# Patient Record
Sex: FEMALE | Race: WHITE | Employment: UNEMPLOYED | URBAN - METROPOLITAN AREA
[De-identification: names, ages, dates, MRNs, and addresses within clinical notes are randomized per-mention and may not be internally consistent; named-entity substitution may affect disease eponyms.]

---
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
## 2024-05-06 RX ORDER — ESCITALOPRAM OXALATE 20 MG/1
30 TABLET ORAL DAILY
Qty: 135 TABLET | Refills: 0 | Status: SHIPPED | OUTPATIENT
Start: 2024-05-06

## 2024-05-06 NOTE — TELEPHONE ENCOUNTER
Last OV: 12/27/2023  Last RX: 11/15/2023   Next scheduled apt: Visit date not found      Surescripts requesting refill     Stage 14: Number Of Blocks?: 0

## 2024-05-13 ENCOUNTER — OFFICE VISIT (OUTPATIENT)
Dept: FAMILY MEDICINE CLINIC | Age: 19
End: 2024-05-13
Payer: COMMERCIAL

## 2024-05-13 VITALS
BODY MASS INDEX: 23.82 KG/M2 | DIASTOLIC BLOOD PRESSURE: 80 MMHG | HEART RATE: 82 BPM | SYSTOLIC BLOOD PRESSURE: 118 MMHG | HEIGHT: 65 IN | WEIGHT: 143 LBS | OXYGEN SATURATION: 98 %

## 2024-05-13 DIAGNOSIS — Z98.890 S/P HIP ARTHROSCOPY: ICD-10-CM

## 2024-05-13 DIAGNOSIS — Z48.02 VISIT FOR SUTURE REMOVAL: Primary | ICD-10-CM

## 2024-05-13 DIAGNOSIS — F41.9 ANXIETY: ICD-10-CM

## 2024-05-13 PROCEDURE — G8420 CALC BMI NORM PARAMETERS: HCPCS | Performed by: NURSE PRACTITIONER

## 2024-05-13 PROCEDURE — 1036F TOBACCO NON-USER: CPT | Performed by: NURSE PRACTITIONER

## 2024-05-13 PROCEDURE — 99213 OFFICE O/P EST LOW 20 MIN: CPT | Performed by: NURSE PRACTITIONER

## 2024-05-13 PROCEDURE — G8427 DOCREV CUR MEDS BY ELIG CLIN: HCPCS | Performed by: NURSE PRACTITIONER

## 2024-05-13 RX ORDER — HYDROCODONE BITARTRATE AND ACETAMINOPHEN 7.5; 325 MG/1; MG/1
TABLET ORAL
COMMUNITY
Start: 2024-05-01

## 2024-05-13 RX ORDER — CYCLOBENZAPRINE HCL 5 MG
TABLET ORAL
COMMUNITY
Start: 2024-05-01

## 2024-05-13 ASSESSMENT — PATIENT HEALTH QUESTIONNAIRE - PHQ9
SUM OF ALL RESPONSES TO PHQ QUESTIONS 1-9: 0
2. FEELING DOWN, DEPRESSED OR HOPELESS: NOT AT ALL
SUM OF ALL RESPONSES TO PHQ QUESTIONS 1-9: 0
SUM OF ALL RESPONSES TO PHQ9 QUESTIONS 1 & 2: 0
SUM OF ALL RESPONSES TO PHQ QUESTIONS 1-9: 0
1. LITTLE INTEREST OR PLEASURE IN DOING THINGS: NOT AT ALL
SUM OF ALL RESPONSES TO PHQ QUESTIONS 1-9: 0

## 2024-05-13 NOTE — PATIENT INSTRUCTIONS
THANK YOU FOR TRUSTING US WITH YOUR HEALTHCARE !!    The associates caring for you today were:    Family Practice Provider: Mercedes MONTANA-BIANCA    Clinical Team Nurse: Clara Jacobsen LPN    Clinical Team Medical Assistant: Kathie Dang MA    Our goal is to provide you with EXCEPTIONAL patient care, and we strive to do this for EVERY patient, EVERY visit. Since we care about you and your experience, you may receive a patient satisfaction survey from [employer] by postal mail/email/text. We truly welcome your feedback and ask that you complete the survey to let us know how we are doing.    Important Numbers:  [facility] phone [phone]   [facility] Nurse/[place] Line: [phone]  Fax  [phone]   Central [phone]  Billing questions: [phone]  Medical Records Request: [phone]    Manage your healthcare  with [facility] My[facility]. To activate your account, visit [email or web address]   DIGITAL scheduling available now through my chart.  Schedule your next appointment at your convenience through your my chart.       [facility] Office Hours:  Monday: [place] office location 8-5 ([phone]) Offering additional late hours the first Monday of the month until 7 pm.   Tuesday: 8: :  812 Noon, closed  of the month   : 7:30-4:30   SURVEY:    You may be receiving a survey from Olga Nelson regarding your visit today.    Please complete the survey to enable us to provide the highest quality of care to you and your family.    If you cannot score us a very good on any question, please call the office to discuss how we could have made your experience a very good one.    Thank you.

## 2024-05-14 ASSESSMENT — ENCOUNTER SYMPTOMS
EYES NEGATIVE: 1
SHORTNESS OF BREATH: 0
COUGH: 0
CHEST TIGHTNESS: 0
WHEEZING: 0

## 2024-05-14 NOTE — PROGRESS NOTES
HPI Notes    Name: Jeff Hill  : 2005         Chief Complaint:     Chief Complaint   Patient presents with    Suture / Staple Removal     Pt had surgery in [place] on 24 with Dr John, she has approx 8 sutures around her right anterior hip area that need removed today. Pt had right hip capsule repair/revision from her previous hip surgery 6 months prior       History of Present Illness:        Suture / Staple Removal        Patient here today for suture removal status post capsular repair of her right hip area with previous labrum repair as separate surgery time wounds are well-approximated no signs of infection no redness or drainage patient does have a lot of adhesive remaining on the leg in the abdomen area from her original dressing changes she has limitations with her physical therapy also is wearing a brace today that she is able to remove for this stitch removal    Patient has 3 mattress stitches in 3 individual which all 6 are removed today without incident patient tolerated well wounds are cleaned with Betadine right anterior lowest wound is to quarter inch Steri-Strips are applied to that area only    Wound care is reviewed patient with diligent attention to prevent injury and stay within limits of her recovery    Continue physical therapy at [facility]    Patient with anxiety and is on Lexapro 30 mg pharmacy would not dispense the full 30 mg dosing has been on that for her entire school year and has done well will consider adding SNRI or see if patient does well with the 20 mg with using Vistaril as a supplement she says she can notice a difference likely when she gets to the 20 mg dose she will stay consistent and do okay    Past Medical History:     Past Medical History:   Diagnosis Date    Anxiety     Migraines       Reviewed all health maintenance requirements and ordered appropriate tests  Health Maintenance Due   Topic Date Due    HIV screen  Never done    Chlamydia/GC screen  Never

## 2024-05-20 RX ORDER — ESCITALOPRAM OXALATE 10 MG/1
10 TABLET ORAL DAILY
Qty: 90 TABLET | Refills: 1 | OUTPATIENT
Start: 2024-05-20

## 2024-05-28 RX ORDER — ESCITALOPRAM OXALATE 20 MG/1
TABLET ORAL
Qty: 90 TABLET | OUTPATIENT
Start: 2024-05-28

## 2024-09-16 RX ORDER — ESCITALOPRAM OXALATE 20 MG/1
TABLET ORAL
Qty: 135 TABLET | Refills: 0 | Status: SHIPPED | OUTPATIENT
Start: 2024-09-16

## 2024-12-09 ENCOUNTER — OFFICE VISIT (OUTPATIENT)
Dept: FAMILY MEDICINE CLINIC | Age: 19
End: 2024-12-09
Payer: COMMERCIAL

## 2024-12-09 VITALS
OXYGEN SATURATION: 96 % | HEART RATE: 119 BPM | WEIGHT: 141 LBS | DIASTOLIC BLOOD PRESSURE: 70 MMHG | HEIGHT: 65 IN | SYSTOLIC BLOOD PRESSURE: 110 MMHG | BODY MASS INDEX: 23.49 KG/M2 | TEMPERATURE: 98.5 F

## 2024-12-09 DIAGNOSIS — J18.9 ATYPICAL PNEUMONIA: Primary | ICD-10-CM

## 2024-12-09 PROCEDURE — G8420 CALC BMI NORM PARAMETERS: HCPCS | Performed by: NURSE PRACTITIONER

## 2024-12-09 PROCEDURE — G8427 DOCREV CUR MEDS BY ELIG CLIN: HCPCS | Performed by: NURSE PRACTITIONER

## 2024-12-09 PROCEDURE — G8484 FLU IMMUNIZE NO ADMIN: HCPCS | Performed by: NURSE PRACTITIONER

## 2024-12-09 PROCEDURE — 1036F TOBACCO NON-USER: CPT | Performed by: NURSE PRACTITIONER

## 2024-12-09 PROCEDURE — 99213 OFFICE O/P EST LOW 20 MIN: CPT | Performed by: NURSE PRACTITIONER

## 2024-12-09 RX ORDER — AZITHROMYCIN 250 MG/1
TABLET, FILM COATED ORAL
Qty: 1 PACKET | Refills: 0 | Status: SHIPPED | OUTPATIENT
Start: 2024-12-09 | End: 2024-12-19

## 2024-12-09 NOTE — PROGRESS NOTES
Symptoms started Friday, chest fullness, sore throat, fever 101, chills, and sweats, phlegm is clear   Went to  on Sat, Augmentin and capmist DM - still taking the antibiotic   Taking Tylenol and Ibuprofen   No cough, but hurts to cough; no chest pain, different headaches, runny nose, congestion, had 1 episode of eye redness, intermittent bilateral ear pain    Her pulse ox got as high as 145     Been staying at home, brother was sick last week, sister was also around him and is sick now   No recent travels    No smoking or vaping    Drinking fluids but slight loss of appetite    Covid test was negative

## 2024-12-09 NOTE — PROGRESS NOTES
HPI Notes    Name: Jeff Hill  : 2005         Chief Complaint:     Chief Complaint   Patient presents with   • Fever     Low grade. Ongoing x 3-4 days. Went to urgent care on Saturday and was given an antibiotic and cough medicine with no improvement   • Sweats     Ongoing x 3-4 days   • Sore Throat     Ongoing x 3-4 days   • Cough     Ongoing x 3-4 days   • Abdominal Pain       History of Present Illness:        HPI  Pt with c/o illness onset Friday of last week .   Brother with atypical bronchitis/like pneumonia  Cold sweats and felt chilled.     Fever 101.   Hard to eat no appetite     Chest tightness.         Past Medical History:     Past Medical History:   Diagnosis Date   • Anxiety    • Migraines       Reviewed all health maintenance requirements and ordered appropriate tests  Health Maintenance Due   Topic Date Due   • HIV screen  Never done   • Chlamydia/GC screen  Never done   • Hepatitis C screen  Never done   • Flu vaccine (1) 2024   • COVID-19 Vaccine ( season) 2024       Past Surgical History:     History reviewed. No pertinent surgical history.     Medications:       Prior to Admission medications    Medication Sig Start Date End Date Taking? Authorizing Provider   amoxicillin-clavulanate (AUGMENTIN) 875-125 MG per tablet Take 1 tablet by mouth 2 times daily 24 Yes Provider, Historical, MD   Pseudoephedrine-DM-GG 60- MG TABS Take 1 tablet by mouth every 6 hours as needed 24 Yes Provider, Historical, MD   azithromycin (ZITHROMAX Z-VIVIAN) 250 MG tablet Two tablets by mouth the first day  then one tablet daily for 4 days for atypical symptoms 24 Yes Mercedes Gil APRN - CNP   escitalopram (LEXAPRO) 20 MG tablet TAKE 1AND 1/2 TABLET BY MOUTH EVERY DAY 24  Yes Mercedes Gil APRN - CNP   HYDROcodone-acetaminophen (NORCO) 7.5-325 MG per tablet  24  Yes Provider, Historical, MD   cyclobenzaprine (FLEXERIL) 5 MG

## 2024-12-09 NOTE — PATIENT INSTRUCTIONS
THANK YOU FOR TRUSTING US WITH YOUR HEALTHCARE !!    The associates caring for you today were:    Family Practice Provider: Mercedes MONTANA-BIANCA    Clinical Team Nurse: Clara Jacobsen LPN    Clinical Team Medical Assistant: Kathie Dang MA    Our goal is to provide you with EXCEPTIONAL patient care, and we strive to do this for EVERY patient, EVERY visit. Since we care about you and your experience, you may receive a patient satisfaction survey from Olga Nelson by postal mail/email/text. We truly welcome your feedback and ask that you complete the survey to let us know how we are doing.    Important Numbers:  [facility] phone [phone]   [place] Office Nurse/[place] Line: [phone]  Fax  [phone]   Central [phone]  Billing questions: [phone]  Medical Records Request: [phone]    Manage your healthcare  with [facility] MyChart. To activate your account, visit [email or web address]   DIGITAL scheduling available now through my chart.  Schedule your next appointment at your convenience through your my chart.       [place] Office Hours:  Monday: [place] office location 8-5 ([phone]) Offering additional late hours the first Monday of the month until 7 pm.   Tuesday: 8-: :  8-12 Noon, closed  of the month   : 7:30-4:30   SURVEY:    You may be receiving a survey from Olga Nelson regarding your visit today.    Please complete the survey to enable us to provide the highest quality of care to you and your family.    If you cannot score us a very good on any question, please call the office to discuss how we could have made your experience a very good one.    Thank you.     GIVE UPDATE WEDNESDAY AM.

## 2024-12-10 ENCOUNTER — PATIENT MESSAGE (OUTPATIENT)
Dept: FAMILY MEDICINE CLINIC | Age: 19
End: 2024-12-10

## 2024-12-10 ASSESSMENT — ENCOUNTER SYMPTOMS
RHINORRHEA: 1
ABDOMINAL DISTENTION: 0
COUGH: 1
SORE THROAT: 1

## 2024-12-16 RX ORDER — ESCITALOPRAM OXALATE 20 MG/1
30 TABLET ORAL DAILY
Qty: 135 TABLET | Refills: 1 | Status: SHIPPED | OUTPATIENT
Start: 2024-12-16

## 2024-12-16 NOTE — TELEPHONE ENCOUNTER
Last OV: 12/9/2024  pneumonia   Last RX:    Next scheduled apt: Visit date not found           Surescript requesting a refill   Medication pending for approval

## 2025-05-13 ENCOUNTER — OFFICE VISIT (OUTPATIENT)
Dept: FAMILY MEDICINE CLINIC | Age: 20
End: 2025-05-13
Payer: COMMERCIAL

## 2025-05-13 VITALS
OXYGEN SATURATION: 96 % | BODY MASS INDEX: 23.32 KG/M2 | HEIGHT: 65 IN | HEART RATE: 89 BPM | WEIGHT: 140 LBS | DIASTOLIC BLOOD PRESSURE: 70 MMHG | TEMPERATURE: 98.1 F | SYSTOLIC BLOOD PRESSURE: 106 MMHG

## 2025-05-13 DIAGNOSIS — J01.10 ACUTE NON-RECURRENT FRONTAL SINUSITIS: Primary | ICD-10-CM

## 2025-05-13 PROCEDURE — 1036F TOBACCO NON-USER: CPT | Performed by: NURSE PRACTITIONER

## 2025-05-13 PROCEDURE — G8427 DOCREV CUR MEDS BY ELIG CLIN: HCPCS | Performed by: NURSE PRACTITIONER

## 2025-05-13 PROCEDURE — G8420 CALC BMI NORM PARAMETERS: HCPCS | Performed by: NURSE PRACTITIONER

## 2025-05-13 PROCEDURE — 99213 OFFICE O/P EST LOW 20 MIN: CPT | Performed by: NURSE PRACTITIONER

## 2025-05-13 SDOH — ECONOMIC STABILITY: FOOD INSECURITY: WITHIN THE PAST 12 MONTHS, THE FOOD YOU BOUGHT JUST DIDN'T LAST AND YOU DIDN'T HAVE MONEY TO GET MORE.: NEVER TRUE

## 2025-05-13 SDOH — ECONOMIC STABILITY: FOOD INSECURITY: WITHIN THE PAST 12 MONTHS, YOU WORRIED THAT YOUR FOOD WOULD RUN OUT BEFORE YOU GOT MONEY TO BUY MORE.: NEVER TRUE

## 2025-05-13 ASSESSMENT — PATIENT HEALTH QUESTIONNAIRE - PHQ9
SUM OF ALL RESPONSES TO PHQ QUESTIONS 1-9: 0
SUM OF ALL RESPONSES TO PHQ QUESTIONS 1-9: 0
2. FEELING DOWN, DEPRESSED OR HOPELESS: NOT AT ALL
SUM OF ALL RESPONSES TO PHQ QUESTIONS 1-9: 0
SUM OF ALL RESPONSES TO PHQ QUESTIONS 1-9: 0
1. LITTLE INTEREST OR PLEASURE IN DOING THINGS: NOT AT ALL

## 2025-05-13 ASSESSMENT — ENCOUNTER SYMPTOMS
NAUSEA: 0
SORE THROAT: 1
SHORTNESS OF BREATH: 0
COUGH: 1
RHINORRHEA: 1
VOMITING: 0
DIARRHEA: 0

## 2025-05-13 NOTE — PATIENT INSTRUCTIONS
SURVEY:    You may be receiving a survey from [facility] regarding your visit today.    You may get this in the mail, through your MyChart or in your email.     Please complete the survey to enable us to provide the highest quality of care to you and your family.      Thank you.    Clinical Care Team:         NAN Hickman-MAGDALENO Regan                         Triage:         MAGDALENO Dominguez             Clerical Team:        Lissa Leung       [place] [phone]           Billing questions: [phone]           Medical Records Request: [phone]

## 2025-05-13 NOTE — PROGRESS NOTES
HPI Notes    Name: Jeff Hill  : 2005         Chief Complaint:     Chief Complaint   Patient presents with    Congestion     X 1 week. States that she is dizzy, blurry vision, and having post nasal drip.        History of Present Illness:        Cold Symptoms   This is a new problem. The current episode started 1 to 4 weeks ago (8 days ago). The problem has been waxing and waning. There has been no fever. Associated symptoms include congestion, coughing, headaches, a plugged ear sensation, rhinorrhea and a sore throat. Pertinent negatives include no chest pain, diarrhea, ear pain, nausea or vomiting. She has tried antihistamine (mucinex) for the symptoms.       Past Medical History:     Past Medical History:   Diagnosis Date    Anxiety     Migraines       Reviewed all health maintenance requirements and ordered appropriate tests  Health Maintenance Due   Topic Date Due    HIV screen  Never done    Meningococcal B vaccine (1 of 2 - Standard) Never done    Chlamydia/GC screen  Never done    Hepatitis C screen  Never done    COVID-19 Vaccine (4 - 2024-25 season) 2024    Pap smear  Never done    Depression Screen  2025       Past Surgical History:     No past surgical history on file.     Medications:       Prior to Admission medications    Medication Sig Start Date End Date Taking? Authorizing Provider   amoxicillin-clavulanate (AUGMENTIN) 875-125 MG per tablet Take 1 tablet by mouth 2 times daily for 7 days 25 Yes Sherif Euceda DNP   escitalopram (LEXAPRO) 20 MG tablet TAKE 1 AND 1/2 TABLETS BY MOUTH EVERY DAY 24  Yes Mercedes Gil, APRN - CNP   HYDROcodone-acetaminophen (NORCO) 7.5-325 MG per tablet  24  Yes Provider, Historical, MD   cyclobenzaprine (FLEXERIL) 5 MG tablet  24  Yes Provider, Historical, MD   QULIPTA 60 MG TABS  10/17/23  Yes Provider, Historical, MD VEGA LOESTRIN FE 1 MG-10 MCG / 10 MCG tablet Take 1 tablet by mouth daily 23  Yes

## 2025-07-01 ENCOUNTER — HOSPITAL ENCOUNTER (OUTPATIENT)
Age: 20
Setting detail: SPECIMEN
Discharge: HOME | End: 2025-07-01
Payer: COMMERCIAL

## 2025-07-01 ENCOUNTER — OFFICE VISIT (OUTPATIENT)
Dept: PRIMARY CARE CLINIC | Age: 20
End: 2025-07-01
Payer: COMMERCIAL

## 2025-07-01 VITALS
BODY MASS INDEX: 22.82 KG/M2 | SYSTOLIC BLOOD PRESSURE: 110 MMHG | OXYGEN SATURATION: 98 % | HEIGHT: 66 IN | DIASTOLIC BLOOD PRESSURE: 78 MMHG | WEIGHT: 142 LBS | HEART RATE: 80 BPM

## 2025-07-01 DIAGNOSIS — R11.0 NAUSEA: ICD-10-CM

## 2025-07-01 DIAGNOSIS — A08.4 VIRAL GASTROENTERITIS: Primary | ICD-10-CM

## 2025-07-01 DIAGNOSIS — M62.830 BACK MUSCLE SPASM: ICD-10-CM

## 2025-07-01 DIAGNOSIS — R82.998 URINE LEUKOCYTES: ICD-10-CM

## 2025-07-01 DIAGNOSIS — R52 BODY ACHES: ICD-10-CM

## 2025-07-01 DIAGNOSIS — A08.4 VIRAL GASTROENTERITIS: ICD-10-CM

## 2025-07-01 LAB
BILIRUBIN, POC: NORMAL
CLARITY, POC: CLEAR
COLOR, POC: YELLOW
GLUCOSE URINE, POC: NORMAL MG/DL
KETONES, POC: NORMAL MG/DL
LEUKOCYTE EST, POC: NORMAL
NITRITE, POC: NORMAL
PH, POC: 7
PROTEIN, POC: NORMAL MG/DL
SPECIFIC GRAVITY, POC: 1.02
UROBILINOGEN, POC: 0.2 MG/DL

## 2025-07-01 PROCEDURE — 81003 URINALYSIS AUTO W/O SCOPE: CPT | Performed by: NURSE PRACTITIONER

## 2025-07-01 PROCEDURE — G8427 DOCREV CUR MEDS BY ELIG CLIN: HCPCS | Performed by: NURSE PRACTITIONER

## 2025-07-01 PROCEDURE — 87077 CULTURE AEROBIC IDENTIFY: CPT

## 2025-07-01 PROCEDURE — 1036F TOBACCO NON-USER: CPT | Performed by: NURSE PRACTITIONER

## 2025-07-01 PROCEDURE — 99213 OFFICE O/P EST LOW 20 MIN: CPT | Performed by: NURSE PRACTITIONER

## 2025-07-01 PROCEDURE — 87086 URINE CULTURE/COLONY COUNT: CPT

## 2025-07-01 PROCEDURE — G8420 CALC BMI NORM PARAMETERS: HCPCS | Performed by: NURSE PRACTITIONER

## 2025-07-01 RX ORDER — NORETHINDRONE ACETATE AND ETHINYL ESTRADIOL AND FERROUS FUMARATE 1.5-30(21)
1 KIT ORAL DAILY
COMMUNITY
Start: 2025-04-17

## 2025-07-01 RX ORDER — ONDANSETRON 4 MG/1
4 TABLET, ORALLY DISINTEGRATING ORAL EVERY 12 HOURS PRN
Qty: 10 TABLET | Refills: 0 | Status: SHIPPED | OUTPATIENT
Start: 2025-07-01

## 2025-07-01 RX ORDER — CYCLOBENZAPRINE HCL 5 MG
5 TABLET ORAL NIGHTLY PRN
Qty: 15 TABLET | Refills: 0 | Status: SHIPPED | OUTPATIENT
Start: 2025-07-01 | End: 2025-09-29

## 2025-07-01 ASSESSMENT — ENCOUNTER SYMPTOMS
ABDOMINAL PAIN: 1
CHEST TIGHTNESS: 0
ABDOMINAL DISTENTION: 0
NAUSEA: 1
VOMITING: 0
WHEEZING: 0
COUGH: 0
SORE THROAT: 0
BLOOD IN STOOL: 0
ANAL BLEEDING: 0
SHORTNESS OF BREATH: 0
CONSTIPATION: 0
DIARRHEA: 1
RECTAL PAIN: 0
EYES NEGATIVE: 1

## 2025-07-01 NOTE — PATIENT INSTRUCTIONS
THANK YOU FOR TRUSTING US WITH YOUR HEALTHCARE !!    The associates caring for you today were:    Family Practice Provider: Mercedes MONTANA-BIANCA    Clinical Team Nurse: Clara Jacobsen LPN    Clinical Team Medical Assistant: Kathie Dang MA    Our goal is to provide you with EXCEPTIONAL patient care, and we strive to do this for EVERY patient, EVERY visit. Since we care about you and your experience, you may receive a patient satisfaction survey from [facility] by postal mail/email/text. We truly welcome your feedback and ask that you complete the survey to let us know how we are doing.    Important Numbers:  [facility] phone [phone]   [facility] Nurse/MA Line: [phone]  Fax  [phone]   Central [phone]  Billing questions: [phone]  Medical Records Request: [phone]    Manage your healthcare  with [facility] My[facility]. To activate your account, visit [email or web address]   DIGITAL scheduling available now through my chart.  Schedule your next appointment at your convenience through your my chart.       [facility] Hours:  Monday: [place] office location 8-5 ([phone]) Offering additional late hours the first Monday of the month until 7 pm.   Tuesday: : :  812 Noon, closed  of the month   Fridays: 7:30-4:30

## 2025-07-01 NOTE — PROGRESS NOTES
[facility] [place]  [address] [address]  Dept: [phone]  Dept Fax: [phone]    Last encounter 12/27/2023    Abdominal Pain (Pt states for the last 4-5 days every time after she eats she feels nauseous, but doesn't vomit. ) and Diarrhea (Pt has had loose stools for the last 4-5 days, but yesterday stool was liquid. Last BM was last night before bed (liquid/loose). Denies any blood)       HPI:   Jeff Hill is a 20 y.o. female who presentstoday for her medical conditions/complaints as noted below.  Jeff Hill is c/o of Abdominal Pain (Pt states for the last 4-5 days every time after she eats she feels nauseous, but doesn't vomit. ) and Diarrhea (Pt has had loose stools for the last 4-5 days, but yesterday stool was liquid. Last BM was last night before bed (liquid/loose). Denies any blood)      HPI  Established patient    Pt with c/o abdominal pain especially after eating.     Went out to eat Friday at [employer]   Yesterday after lunch diarrhea. Some hot/cold 99 temp   No vomiting. , nausea present.     (Burger , sweet potato fried, burger well done) lettuce tomato on )     Diarrhea one of diarrhea immediately after eating.   One meal a day since she has been sick.     Nausea constantly there.     Central abd pain.       No hx UTI , not sexually active.   Tried omeprazole once no help. One dose     On a few vitiligo  hands , elbows, knees, armpit.     Patient denies any pain in the right upper quadrant or into the back she does feel like her right hip which has had multiple surgeries (3 total (that she is still receiving physical therapy for does hurt when she takes a deep breath she has had some neck pain recently does have some kyphosis with her back and is working with physical therapy to help with the spasm and ache that she feels there    She was on some Flexeril before them okay refilling giving her small

## 2025-07-03 ENCOUNTER — RESULTS FOLLOW-UP (OUTPATIENT)
Dept: PRIMARY CARE CLINIC | Age: 20
End: 2025-07-03

## 2025-07-03 LAB
MICROORGANISM SPEC CULT: ABNORMAL
SERVICE CMNT-IMP: ABNORMAL
SPECIMEN DESCRIPTION: ABNORMAL

## 2025-07-03 RX ORDER — NITROFURANTOIN 25; 75 MG/1; MG/1
100 CAPSULE ORAL 2 TIMES DAILY
Qty: 20 CAPSULE | Refills: 0 | Status: SHIPPED | OUTPATIENT
Start: 2025-07-03 | End: 2025-07-13

## 2025-07-28 RX ORDER — ESCITALOPRAM OXALATE 20 MG/1
30 TABLET ORAL DAILY
Qty: 135 TABLET | Refills: 1 | Status: SHIPPED | OUTPATIENT
Start: 2025-07-28